# Patient Record
Sex: FEMALE | NOT HISPANIC OR LATINO | URBAN - METROPOLITAN AREA
[De-identification: names, ages, dates, MRNs, and addresses within clinical notes are randomized per-mention and may not be internally consistent; named-entity substitution may affect disease eponyms.]

---

## 2019-01-17 ENCOUNTER — IMPORTED ENCOUNTER (OUTPATIENT)
Dept: URBAN - METROPOLITAN AREA CLINIC 1 | Facility: CLINIC | Age: 34
End: 2019-01-17

## 2019-01-17 PROBLEM — H04.123: Noted: 2019-01-17

## 2019-01-17 PROBLEM — H10.413: Noted: 2019-01-17

## 2019-01-17 PROBLEM — H10.45: Noted: 2019-01-17

## 2019-01-17 PROCEDURE — 92004 COMPRE OPH EXAM NEW PT 1/>: CPT

## 2019-01-17 NOTE — PATIENT DISCUSSION
1.  Giant Papillary Conjunctivitis OU -- Will need to refit patient in Daily CTL under [facility] within the next week. Begin Lotemax BID OU (Sample / Coupon Given & ERx'd). Discussed & patient understands depending on her insurance deductible pricing of Lotemax could vary. 2.  Mild Allergic Conjunctivitis OU -- The condition was discussed with the patient. Avoidance of allergens and cool compresses were recommended. 3.  Dry Eyes OU -- Recommend the frequent use of OTC AT's BID-QID OU Routinely. Return for an appointment in 1 WK for a 36 / CC OU with Dr. Chaitanya Watkins. Patient defers the refraction at today's visit (will return under [address] / Insurance).

## 2019-01-28 ENCOUNTER — IMPORTED ENCOUNTER (OUTPATIENT)
Dept: URBAN - METROPOLITAN AREA CLINIC 1 | Facility: CLINIC | Age: 34
End: 2019-01-28

## 2019-01-28 PROBLEM — H52.13: Noted: 2019-01-28

## 2019-01-28 PROCEDURE — S0621 ROUTINE OPHTHALMOLOGICAL EXA: HCPCS

## 2019-01-28 NOTE — PATIENT DISCUSSION
1. Myopia OU- Rx for glasses/ CLs given. Ok to resume CL Wear. 2.  Giant Papillary Conjunctivitis OU -- resolving. OK to resume CL Wear. Refit with Dail[facility] today. 3.  Mild Allergic Conjunctivitis OU -- Use Zaditor BID OU PRN for allergies. 4.  Dry Eyes OU -- Use ATs TID OU Routinely. Return for an appointment in 1 week cc with Dr. Edi Delgado.

## 2019-02-04 ENCOUNTER — IMPORTED ENCOUNTER (OUTPATIENT)
Dept: URBAN - METROPOLITAN AREA CLINIC 1 | Facility: CLINIC | Age: 34
End: 2019-02-04

## 2020-02-10 ENCOUNTER — IMPORTED ENCOUNTER (OUTPATIENT)
Dept: URBAN - METROPOLITAN AREA CLINIC 1 | Facility: CLINIC | Age: 35
End: 2020-02-10

## 2020-02-10 PROBLEM — H52.13: Noted: 2020-02-10

## 2020-02-10 PROCEDURE — S0621 ROUTINE OPHTHALMOLOGICAL EXA: HCPCS

## 2020-02-10 NOTE — PATIENT DISCUSSION
1. Myopia -- Rx was given for correction if indicated and requested. New CTL trials given. 2. Mild Allergic Conjunctivitis OU -- Use Zaditor BID OU PRN for allergies. 3.  Dry Eyes OU -- Use ATs TID OU Routinely. Return for an appointment in 1 week cc with Dr. Raina Vickers.

## 2020-02-28 ENCOUNTER — IMPORTED ENCOUNTER (OUTPATIENT)
Dept: URBAN - METROPOLITAN AREA CLINIC 1 | Facility: CLINIC | Age: 35
End: 2020-02-28

## 2022-04-02 ASSESSMENT — VISUAL ACUITY
OS_SC: 20/20-1
OS_SC: 20/20
OD_SC: 20/20
OS_SC: 20/20
OS_CC: J1+
OD_CC: J1+
OD_SC: 20/20
OS_SC: 20/20
OS_SC: 20/20
OS_CC: J1+
OD_SC: 20/20
OD_CC: J1+
OD_SC: 20/20-1
OD_SC: 20/20

## 2022-04-02 ASSESSMENT — KERATOMETRY
OD_AXISANGLE_DEGREES: 009
OD_K2POWER_DIOPTERS: 43.75
OS_K2POWER_DIOPTERS: 43.25
OS_K1POWER_DIOPTERS: 42.75
OS_AXISANGLE_DEGREES: 170
OS_AXISANGLE2_DEGREES: 080
OD_AXISANGLE2_DEGREES: 099
OD_K1POWER_DIOPTERS: 43.00

## 2022-04-02 ASSESSMENT — TONOMETRY
OD_IOP_MMHG: 15
OS_IOP_MMHG: 16
OS_IOP_MMHG: 15
OD_IOP_MMHG: 16
OD_IOP_MMHG: 16
OS_IOP_MMHG: 16